# Patient Record
Sex: FEMALE | Race: ASIAN
[De-identification: names, ages, dates, MRNs, and addresses within clinical notes are randomized per-mention and may not be internally consistent; named-entity substitution may affect disease eponyms.]

---

## 2024-03-26 ENCOUNTER — APPOINTMENT (OUTPATIENT)
Dept: NEUROLOGY | Facility: CLINIC | Age: 79
End: 2024-03-26
Payer: MEDICARE

## 2024-03-26 VITALS
SYSTOLIC BLOOD PRESSURE: 137 MMHG | HEART RATE: 80 BPM | WEIGHT: 117 LBS | BODY MASS INDEX: 21.53 KG/M2 | HEIGHT: 62 IN | DIASTOLIC BLOOD PRESSURE: 65 MMHG | OXYGEN SATURATION: 98 %

## 2024-03-26 PROCEDURE — 99204 OFFICE O/P NEW MOD 45 MIN: CPT

## 2024-03-26 NOTE — PHYSICAL EXAM
[FreeTextEntry1] :   General: Cooperative, NAD HEENT: NC/AT, no carotid bruits Lungs: CTAB Chest: RRR, no murmurs Extremities: nontender, no erythema Neurological Examination: NIHSS: 0 MS: AOx3. Appropriately interactive, normal affect. Speech fluent w/o paraphasic errors, NO dysarthria CN: PERLL, EOMI, V1-3 sensation intact, face symmetric, hearing intact, palate elevates symmetrically, tongue midline, SCM equal bilaterally Motor: normal bulk and tone, no tremor, rigidity or bradykinesia.  5/5 all over Sens: Intact to light touch. Reflexes: 2/4 all over, downgoing toes b/l Coord:  No dysmetria, KIMBERLY intact Gait: Normal

## 2024-03-26 NOTE — HISTORY OF PRESENT ILLNESS
[FreeTextEntry1] :  [facility] NEUROLOGY AT [place]  CC: Possible slurred speech HPI:  78 y/o F hx of CAD, hx of PAF on Eliquis, hx of DVT, PPM who presents for evaluation of possible slurred speech.  She states a few weeks she was cleaning the counter and got up and hit the counter.  No LOC.  Had some pain in the left side of her head.  PCP ordered a CT head which was negative per daughter (a neurologist). No issues afterwards.  Also just went to [place] and came back yesterday.  States while there, she was shopping a few days ago in [place] and had chest pain, was seen by cardiologist, given IV lasix 80mg, TTE, ECG--all normal per patient.   But has noted since she came back she is having intermittent "dry mouth" and difficulty with pronunciation.  She is on lasix 20mg daily. Has noticed the dry mouth feeling even before she went to [place].  She is worried that she is having a stroke given her hx of PAF.     Previously followed by Dr. Rock Davis for primary stroke prevention.  Does give a hx of transient R facial droop in 2014--she told her daughter a neurologist--who sent her for CT which was negative.  Was told it was not a stroke.    Also has complaints of cramping in the legs last night (also just came back from [place] yesterday).      Denies any diplopia, dysarthria, dysphagia, numbness, tingling, weakness, difficulty walking, vision changes or other focal deficits. She maintains compliance with Eliquis 5mg BID.  She is scheduled to see her cardiologist soon.  Saw PCP today and is scheduled to obtain blood work.

## 2024-09-24 ENCOUNTER — APPOINTMENT (OUTPATIENT)
Dept: NEUROLOGY | Facility: CLINIC | Age: 79
End: 2024-09-24

## 2024-09-24 NOTE — ASSESSMENT
[FreeTextEntry1] :  78 y/o F hx of CAD, hx of PAF on Eliquis, hx of DVT, PPM who presents for evaluation of possible slurred speech and muscle cramping.   Patient has complaints of intermittent feeling of "dry mouth" and difficulty with pronunciation, but without any other focal deficits.  She is on daily lasix and recently received an IV dose that is 4x her normal dose.  I suspect this may be causing the dry mouth and the muscle cramping as well.   At this time, low suspicion of a neurovascular event.  I have however discussed with her and her daughter (Dr. Annette Sanchez--neurologist), that we should evaluate her carotids.  PLAN: Primary stroke prevention - Cont. Eliquis for PAF - Lipid Panel, HbA1c - Carotid duplex  Dry mouth/cramping--likely secondary to Lasix - CMP ordered by pcp - f/u with Cardiologist  f/u in 6 months

## 2024-09-24 NOTE — PHYSICAL EXAM
Discharge Instructions for Femoral Heart Catheterization  Take it easy for 3-4 days, limit vigorous activity (contact sports) for 2 weeks  No driving for 2 days  No lifting over 5 lbs for 1 week, this is a half gallon of milk  May shower after 24 hours  May remove dressing and apply band-aid after 24 hours.   Apply a clean band-aid daily for 5 days over the incision.  No creams, ointments, or powders near site for 2 weeks.  No hot tub, swimming or tub bath for 1 week  Gently clean your site daily using soap and water while standing in the shower. Dry thoroughly.  10. Monitor site for infection- redness, increased pain, hardness or drainage at site, elevated temperature, poor healing of incision, fever, chills.   11. If bleeding from incision, apply pressure and call 911 immediately.         Discharge Instructions for Radial Heart Catherization  1.  Take it easy for 3-4 days.  2.  No driving for 2 days.  3.  No lifting of 5 lbs or more for 5 days with the affected arm.  4.  May shower after 24 hours.  5.  Remove arm board after 24 hours.  6.  Apply a band aid to the insertion site daily for 5 days.  Wash site daily with soap and water.  7.  No creams, ointments, or powders near the insertion site.   8.  No tub baths, swimming, hot tubs, or hand washing dishes for 1 week.  9.  Watch for signs of infection (redness, warmth, swelling, or pus drainage) or coolness of extremity and call physician if this occurs  10.  If bleeding occurs from insertion site, apply pressure and call 911.   11. Watch for numbness/tingling- if this occurs go to the Emergency Room immediately.  12. Apply Ice for 15 minutes with an hour break in between and alternate with heat compress for 15 minutes to reduce swelling for 3-5 days.  
[FreeTextEntry1] :   General: Cooperative, NAD HEENT: NC/AT, no carotid bruits Lungs: CTAB Chest: RRR, no murmurs Extremities: nontender, no erythema Neurological Examination: NIHSS: 0 MS: AOx3. Appropriately interactive, normal affect. Speech fluent w/o paraphasic errors, NO dysarthria CN: PERLL, EOMI, V1-3 sensation intact, face symmetric, hearing intact, palate elevates symmetrically, tongue midline, SCM equal bilaterally Motor: normal bulk and tone, no tremor, rigidity or bradykinesia.  5/5 all over Sens: Intact to light touch. Reflexes: 2/4 all over, downgoing toes b/l Coord:  No dysmetria, KIMBERLY intact Gait: Normal
[FreeTextEntry1] :   General: Cooperative, NAD HEENT: NC/AT, no carotid bruits Lungs: CTAB Chest: RRR, no murmurs Extremities: nontender, no erythema Neurological Examination: NIHSS: 0 MS: AOx3. Appropriately interactive, normal affect. Speech fluent w/o paraphasic errors, NO dysarthria CN: PERLL, EOMI, V1-3 sensation intact, face symmetric, hearing intact, palate elevates symmetrically, tongue midline, SCM equal bilaterally Motor: normal bulk and tone, no tremor, rigidity or bradykinesia.  5/5 all over Sens: Intact to light touch. Reflexes: 2/4 all over, downgoing toes b/l Coord:  No dysmetria, KIMBERLY intact Gait: Normal

## 2024-09-27 ENCOUNTER — APPOINTMENT (OUTPATIENT)
Dept: NEUROLOGY | Facility: CLINIC | Age: 79
End: 2024-09-27
Payer: MEDICARE

## 2024-09-27 VITALS
DIASTOLIC BLOOD PRESSURE: 72 MMHG | OXYGEN SATURATION: 98 % | HEART RATE: 64 BPM | HEIGHT: 62 IN | BODY MASS INDEX: 22.08 KG/M2 | WEIGHT: 120 LBS | SYSTOLIC BLOOD PRESSURE: 163 MMHG

## 2024-09-27 DIAGNOSIS — R20.0 ANESTHESIA OF SKIN: ICD-10-CM

## 2024-09-27 PROCEDURE — 99204 OFFICE O/P NEW MOD 45 MIN: CPT

## 2024-09-27 PROCEDURE — 99214 OFFICE O/P EST MOD 30 MIN: CPT

## 2024-09-27 NOTE — ASSESSMENT
[FreeTextEntry1] :  78 y/o F hx of CAD, hx of PAF on Eliquis, hx of DVT, PPM who presents for evaluation of bilateral hand paresthesias and pain, and occasional gait imbalance.  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  Swaying, likely due to hx of vestibular dysfunction.    Given pacemaker, likely not compatible with MRI, therefore will evaluate with CTs. We discussed obtaining an EMG but she would hold off.     PLAN: Primary stroke prevention - Cont. Eliquis for PAF - Lipid Panel, HbA1c - Carotid duplex - BP elevated today--advised to f/u with Cardiologist  Bilateral hand numbness, likely CTS - Restart wearing wrist splints - Patient would like to hold off on EMG - CT cervical spine  Swaying, likely vestibular dysfunction - CT head, CUS - CT cervical spine  f/u in 2 months

## 2024-09-27 NOTE — PHYSICAL EXAM
[FreeTextEntry1] :   General: Cooperative, NAD HEENT: NC/AT, no carotid bruits Lungs: CTAB Chest: RRR, no murmurs Extremities: nontender, no erythema Neurological Examination: NIHSS: 0 MS: AOx3. Appropriately interactive, normal affect. Speech fluent w/o paraphasic errors, NO dysarthria CN: PERLL, EOMI, V1-3 sensation intact, face symmetric, hearing intact, palate elevates symmetrically, tongue midline, SCM equal bilaterally Motor:  5/5 all over except hand strength 4/5, atrophic thenar eminence b/l, neg tinel's sign Sens: Intact to light touch. Reflexes: 2/4 all over, downgoing toes b/l Coord:  No dysmetria, KIMBERLY intact Gait: Normal, neg Romberg's

## 2024-09-27 NOTE — HISTORY OF PRESENT ILLNESS
[FreeTextEntry1] :  [facility] NEUROLOGY AT [place]  CC: Possible slurred speech HPI:  78 y/o F hx of CAD, hx of PAF on Eliquis, hx of DVT, PPM who presents for follow-up of possible slurred speech.    3/26/24: She states a few weeks she was cleaning the counter and got up and hit the counter.  No LOC.  Had some pain in the left side of her head.  PCP ordered a CT head which was negative per daughter (a neurologist). No issues afterwards.  Also just went to [place] and came back yesterday.  States while there, she was shopping a few days ago in [place] and had chest pain, was seen by cardiologist, given IV lasix 80mg, TTE, ECG--all normal per patient.   But has noted since she came back she is having intermittent "dry mouth" and difficulty with pronunciation.  She is on lasix 20mg daily. Has noticed the dry mouth feeling even before she went to [place].  She is worried that she is having a stroke given her hx of PAF.     Previously followed by Dr. Rock Davis for primary stroke prevention.  Does give a hx of transient R facial droop in 2014--she told her daughter a neurologist--who sent her for CT which was negative.  Was told it was not a stroke.   Also has complaints of cramping in the legs last night (also just came back from [place] yesterday).     Denies any diplopia, dysarthria, dysphagia, numbness, tingling, weakness, difficulty walking, vision changes or other focal deficits. She maintains compliance with Eliquis 5mg BID.  She is scheduled to see her cardiologist soon.  Saw PCP today and is scheduled to obtain blood work.   Today 9/27/24: States she has numbness in both hands, mostly noticed upon awakening, or repetitive actions such as cutting or cleaning.  Also has intermittent swaying, mostly noted to left.  Feels that this is going on for past few years.  Does have a hx of vertigo and takes meclizine prn, but does not have vertigo associated it.  Thinks she has had an EMG in the past by Dr. Davis.  Unclear results.  Has a wrist splint.

## 2024-10-10 DIAGNOSIS — R26.81 UNSTEADINESS ON FEET: ICD-10-CM

## 2024-11-22 ENCOUNTER — APPOINTMENT (OUTPATIENT)
Dept: NEUROLOGY | Facility: CLINIC | Age: 79
End: 2024-11-22

## 2024-12-16 ENCOUNTER — APPOINTMENT (OUTPATIENT)
Dept: CT IMAGING | Facility: CLINIC | Age: 79
End: 2024-12-16

## 2025-06-25 ENCOUNTER — APPOINTMENT (OUTPATIENT)
Dept: CARDIOTHORACIC SURGERY | Facility: CLINIC | Age: 80
End: 2025-06-25
Payer: MEDICARE

## 2025-06-25 VITALS
HEART RATE: 72 BPM | OXYGEN SATURATION: 95 % | DIASTOLIC BLOOD PRESSURE: 75 MMHG | BODY MASS INDEX: 23 KG/M2 | SYSTOLIC BLOOD PRESSURE: 127 MMHG | RESPIRATION RATE: 14 BRPM | WEIGHT: 125 LBS | HEIGHT: 62 IN

## 2025-06-25 PROCEDURE — 99205 OFFICE O/P NEW HI 60 MIN: CPT

## 2025-06-25 RX ORDER — CARVEDILOL 12.5 MG/1
12.5 TABLET, FILM COATED ORAL
Refills: 0 | Status: ACTIVE | COMMUNITY
Start: 2025-06-25

## 2025-06-25 RX ORDER — ASPIRIN 81 MG/1
81 TABLET, DELAYED RELEASE ORAL DAILY
Qty: 30 | Refills: 0 | Status: ACTIVE | COMMUNITY
Start: 2025-06-25

## 2025-06-25 RX ORDER — FUROSEMIDE 20 MG/1
20 TABLET ORAL
Qty: 3 | Refills: 0 | Status: ACTIVE | COMMUNITY
Start: 2025-06-25

## 2025-06-30 ENCOUNTER — NON-APPOINTMENT (OUTPATIENT)
Age: 80
End: 2025-06-30

## 2025-06-30 RX ORDER — PREDNISONE 20 MG/1
1 TABLET ORAL
Qty: 3 | Refills: 0
Start: 2025-06-30 | End: 2025-06-30

## 2025-06-30 RX ORDER — CAMPHOR 0.45 %
25 GEL (GRAM) TOPICAL
Qty: 2 | Refills: 1 | Status: ACTIVE | COMMUNITY
Start: 2025-06-30 | End: 1900-01-01

## 2025-06-30 RX ORDER — PREDNISONE 50 MG/1
50 TABLET ORAL
Qty: 3 | Refills: 0 | Status: ACTIVE | COMMUNITY
Start: 2025-06-30 | End: 1900-01-01

## 2025-07-01 ENCOUNTER — TRANSCRIPTION ENCOUNTER (OUTPATIENT)
Age: 80
End: 2025-07-01

## 2025-07-01 ENCOUNTER — OUTPATIENT (OUTPATIENT)
Dept: OUTPATIENT SERVICES | Facility: HOSPITAL | Age: 80
LOS: 1 days | End: 2025-07-01
Payer: MEDICARE

## 2025-07-01 VITALS
RESPIRATION RATE: 16 BRPM | OXYGEN SATURATION: 98 % | SYSTOLIC BLOOD PRESSURE: 143 MMHG | DIASTOLIC BLOOD PRESSURE: 68 MMHG | HEART RATE: 62 BPM

## 2025-07-01 VITALS
DIASTOLIC BLOOD PRESSURE: 66 MMHG | HEART RATE: 60 BPM | HEIGHT: 62 IN | SYSTOLIC BLOOD PRESSURE: 147 MMHG | WEIGHT: 126.1 LBS | RESPIRATION RATE: 18 BRPM | OXYGEN SATURATION: 98 %

## 2025-07-01 DIAGNOSIS — I35.0 NONRHEUMATIC AORTIC (VALVE) STENOSIS: ICD-10-CM

## 2025-07-01 LAB
ANION GAP SERPL CALC-SCNC: 14 MMOL/L — SIGNIFICANT CHANGE UP (ref 5–17)
BUN SERPL-MCNC: 21 MG/DL — SIGNIFICANT CHANGE UP (ref 7–23)
CALCIUM SERPL-MCNC: 9.3 MG/DL — SIGNIFICANT CHANGE UP (ref 8.4–10.5)
CHLORIDE SERPL-SCNC: 102 MMOL/L — SIGNIFICANT CHANGE UP (ref 96–108)
CO2 SERPL-SCNC: 24 MMOL/L — SIGNIFICANT CHANGE UP (ref 22–31)
CREAT SERPL-MCNC: 1.1 MG/DL — SIGNIFICANT CHANGE UP (ref 0.5–1.3)
EGFR: 50 ML/MIN/1.73M2 — LOW
EGFR: 50 ML/MIN/1.73M2 — LOW
GLUCOSE SERPL-MCNC: 152 MG/DL — HIGH (ref 70–99)
HCT VFR BLD CALC: 40.5 % — SIGNIFICANT CHANGE UP (ref 34.5–45)
HGB BLD-MCNC: 13 G/DL — SIGNIFICANT CHANGE UP (ref 11.5–15.5)
MCHC RBC-ENTMCNC: 30.4 PG — SIGNIFICANT CHANGE UP (ref 27–34)
MCHC RBC-ENTMCNC: 32.1 G/DL — SIGNIFICANT CHANGE UP (ref 32–36)
MCV RBC AUTO: 94.8 FL — SIGNIFICANT CHANGE UP (ref 80–100)
NRBC # BLD AUTO: 0 K/UL — SIGNIFICANT CHANGE UP (ref 0–0)
NRBC # FLD: 0 K/UL — SIGNIFICANT CHANGE UP (ref 0–0)
NRBC BLD AUTO-RTO: 0 /100 WBCS — SIGNIFICANT CHANGE UP (ref 0–0)
PLATELET # BLD AUTO: 119 K/UL — LOW (ref 150–400)
PMV BLD: 10.6 FL — SIGNIFICANT CHANGE UP (ref 7–13)
POTASSIUM SERPL-MCNC: 4.9 MMOL/L — SIGNIFICANT CHANGE UP (ref 3.5–5.3)
POTASSIUM SERPL-SCNC: 4.9 MMOL/L — SIGNIFICANT CHANGE UP (ref 3.5–5.3)
RBC # BLD: 4.27 M/UL — SIGNIFICANT CHANGE UP (ref 3.8–5.2)
RBC # FLD: 12.4 % — SIGNIFICANT CHANGE UP (ref 10.3–14.5)
SODIUM SERPL-SCNC: 140 MMOL/L — SIGNIFICANT CHANGE UP (ref 135–145)
WBC # BLD: 4.9 K/UL — SIGNIFICANT CHANGE UP (ref 3.8–10.5)
WBC # FLD AUTO: 4.9 K/UL — SIGNIFICANT CHANGE UP (ref 3.8–10.5)

## 2025-07-01 PROCEDURE — 80048 BASIC METABOLIC PNL TOTAL CA: CPT

## 2025-07-01 PROCEDURE — 93454 CORONARY ARTERY ANGIO S&I: CPT

## 2025-07-01 PROCEDURE — C1769: CPT

## 2025-07-01 PROCEDURE — 93005 ELECTROCARDIOGRAM TRACING: CPT

## 2025-07-01 PROCEDURE — C1887: CPT

## 2025-07-01 PROCEDURE — 85027 COMPLETE CBC AUTOMATED: CPT

## 2025-07-01 PROCEDURE — 93454 CORONARY ARTERY ANGIO S&I: CPT | Mod: 26

## 2025-07-01 PROCEDURE — 93010 ELECTROCARDIOGRAM REPORT: CPT

## 2025-07-01 PROCEDURE — C1894: CPT

## 2025-07-01 PROCEDURE — 99152 MOD SED SAME PHYS/QHP 5/>YRS: CPT

## 2025-07-01 RX ORDER — ASPIRIN 325 MG
0 TABLET ORAL
Refills: 0 | DISCHARGE

## 2025-07-01 RX ORDER — FUROSEMIDE 10 MG/ML
1 INJECTION INTRAMUSCULAR; INTRAVENOUS
Refills: 0 | DISCHARGE

## 2025-07-01 RX ORDER — APIXABAN 2.5 MG/1
1 TABLET, FILM COATED ORAL
Qty: 0 | Refills: 0 | DISCHARGE

## 2025-07-01 RX ORDER — ROSUVASTATIN CALCIUM 5 MG/1
1 TABLET, FILM COATED ORAL
Refills: 0 | DISCHARGE

## 2025-07-01 RX ORDER — DIPHENHYDRAMINE HCL 12.5MG/5ML
50 ELIXIR ORAL ONCE
Refills: 0 | Status: COMPLETED | OUTPATIENT
Start: 2025-07-01 | End: 2025-07-01

## 2025-07-01 RX ORDER — CARVEDILOL 3.12 MG/1
1 TABLET, FILM COATED ORAL
Refills: 0 | DISCHARGE

## 2025-07-01 RX ORDER — ASPIRIN 325 MG
1 TABLET ORAL
Refills: 0 | DISCHARGE

## 2025-07-01 RX ADMIN — Medication 50 MILLIGRAM(S): at 14:57

## 2025-07-01 NOTE — ASU PATIENT PROFILE, ADULT - FALL HARM RISK - UNIVERSAL INTERVENTIONS
[facility]   Hospitalist Progress Note  Date: 2025  Patient Name: Desiree Garcia  : 1962  MRN: [id number]  Date of admission: 2025  Room/Bed: [facility]      Subjective   Subjective     Chief Complaint: Abdominal pain    Summary:Desiree Garcia is a 63 y.o. female with coronary disease, COPD, diabetes, vascular disease and other issues.  She presented with abdominal pain that began earlier on the day of admission.  EKG showed ST elevation in lead to 3 and aVF.  She underwent an emergent cardiac cath by Dr. Solano.  Noted to have distal RCA thrombosis status post mechanical aspiration thrombectomy, PCI with drug-eluting stent x 2.  She has been admitted to the medicine service.  CTA showed multiple abnormalities.    Interval Followup: No new issues still has some discomfort in her abdomen that seems unchanged.  Required amiodarone drip earlier      Objective   Objective     Vitals:   Temp:  [98.2 °F (36.8 °C)-99 °F (37.2 °C)] 98.8 °F (37.1 °C)  Heart Rate:  [] 92  Resp:  [16-26] 17  BP: (107-170)/() 133/85  Flow (L/min) (Oxygen Therapy):  [4] 4    Physical Exam   General: Lying in bed, looks like she does not feel well but is nontoxic  HENT: NCAT, MMM  Eyes: pupils equal, no scleral icterus  Cardiovascular: rr  Pulmonary: CTA bilaterally  Gastrointestinal: S/ND/NT, +BS  Musculoskeletal: No gross deformities  Skin: No jaundice, no rash on exposed skin appreciated      Result Review    Result Review:  I have personally reviewed these results:  [x]  Laboratory      Lab 25  0559 25  2201 259 254 25  1728   WBC 21.49*  --   --   --  21.93*   HEMOGLOBIN 14.8  --   --   --  15.4   HEMATOCRIT 45.0  --   --   --  46.3   PLATELETS 296  --   --   --  325   NEUTROS ABS  --   --   --   --  18.27*   IMMATURE GRANS (ABS)  --   --   --   --  0.10*   LYMPHS ABS  --   --   --   --  1.93   MONOS ABS  --   --   --   --  1.55*   EOS ABS  --   --   --   --  0.00    MCV 84.9  --   --   --  85.3   PROCALCITONIN  --   --   --  0.13  --    LACTATE  --   --  2.0  --   --    APTT 39.0* 55.8*  --   --   --          Lab 04/09/25  0602 04/09/25  0559 04/08/25  1728   SODIUM 131*  --  131*   POTASSIUM 3.6  --  3.7   CHLORIDE 92*  --  90*   CO2 26.7  --  25.8   ANION GAP 12.3  --  15.2*   BUN 10  --  9   CREATININE 0.80  --  0.74   EGFR 82.4  --  90.5   GLUCOSE 248*  --  294*   CALCIUM 9.7  --  10.1   MAGNESIUM 1.9  --  1.7   PHOSPHORUS 3.1  --   --    HEMOGLOBIN A1C  --  9.40*  --          Lab 04/08/25  1728   TOTAL PROTEIN 8.3   ALBUMIN 4.0   GLOBULIN 4.3   ALT (SGPT) 16   AST (SGOT) 45*   BILIRUBIN 1.1   ALK PHOS 159*   LIPASE 73*         Lab 04/08/25 2034 04/08/25  1728   PROBNP  --  15,432.0*   HSTROP T 971* 835*         Lab 04/09/25  0602   CHOLESTEROL 147   LDL CHOL 78   HDL CHOL 42   TRIGLYCERIDES 158*             Brief Urine Lab Results  (Last result in the past 365 days)        Color   Clarity   Blood   Leuk Est   Nitrite   Protein   CREAT   Urine HCG        03/18/25 1002 Yellow   Clear   Negative   Small (1+)   Positive   Negative                 [x]  Microbiology   Microbiology Results (last 10 days)       ** No results found for the last 240 hours. **          [x]  Radiology  CT Angiogram Abdomen Pelvis  Result Date: 4/8/2025  Impression: 1.Complete occlusion of the left superficial femoral artery. 2.Complete occlusion of the distal aspect of the left renal artery. 3.Complete occlusion of the distal aspect of the splenic artery. 4.Areas of hypoenhancement noted within the spleen, most significantly anteriorly, concerning for splenic infarcts. 5.Hypoenhancement of the inferior aspect of the left kidney, consistent with renal infarct. 6.Complete occlusion of the ONEIDA with reconstitution distally. 7.Multiple prominent fluid-filled loops of small bowel noted throughout the abdomen. No significant distention to suggest bowel obstruction. 8.Scattered pulmonary nodules, the  largest of which measures 0.6 cm. Multiple indeterminate pulmonary nodules. The largest one is solid and measures 6 mm. For multiple nodules, with the most suspicious nodule being solid and measuring 6-8 mm, recommend CT at 3-6 months. Then, for a low-risk patient, consider CT at 18-24 months. For a high-risk patient, if the nodule is stable at 3-6 months, recommend CT at 18-24 months. Follow-up intervals may vary according to size and risk. Electronically Signed: Julian Rosenthal DO  4/8/2025 7:11 PM EDT  Workstation ID: [id number]    CT Angiogram Chest  Result Date: 4/8/2025  Impression: 1.Complete occlusion of the left superficial femoral artery. 2.Complete occlusion of the distal aspect of the left renal artery. 3.Complete occlusion of the distal aspect of the splenic artery. 4.Areas of hypoenhancement noted within the spleen, most significantly anteriorly, concerning for splenic infarcts. 5.Hypoenhancement of the inferior aspect of the left kidney, consistent with renal infarct. 6.Complete occlusion of the ONEIDA with reconstitution distally. 7.Multiple prominent fluid-filled loops of small bowel noted throughout the abdomen. No significant distention to suggest bowel obstruction. 8.Scattered pulmonary nodules, the largest of which measures 0.6 cm. Multiple indeterminate pulmonary nodules. The largest one is solid and measures 6 mm. For multiple nodules, with the most suspicious nodule being solid and measuring 6-8 mm, recommend CT at 3-6 months. Then, for a low-risk patient, consider CT at 18-24 months. For a high-risk patient, if the nodule is stable at 3-6 months, recommend CT at 18-24 months. Follow-up intervals may vary according to size and risk. Electronically Signed: Julian Rosenthal DO  4/8/2025 7:11 PM EDT  Workstation ID: [id number]    []  EKG/Telemetry   []  Cardiology/Vascular   []  Pathology  []  Old records  []  Other:    Assessment & Plan   Assessment / Plan     Assessment:  Inferior STEMI status  post PCI with JOE x 2 on 4/8/2025  Complete occlusion of the distal branches of the left internal iliac artery   Complete occlusion of the left superficial femoral artery  Complete occlusion of the distal aspect of the left renal artery  Complete occlusion of the distal aspect of the splenic artery  Concern for splenic infarcts  Hypoenhancement of the inferior aspect of the left kidney consistent with renal infract  Multiple bilateral pulmonary nodules  COPD  History of CAD status post stent in 2021  Hypertension  Hyperlipidemia  Type 2 diabetes mellitus  PAD      Plan:  Admitted to ICU  Cardiology consulted, following and directing medical care of CAD.  Currently on heparin infusion and Brilinta  2D echo pending at this time  Amiodarone drip per cardiology  Intensivist consultation, will defer workup of pulmonary nodules to the neurology  Vascular surgery consulted by cardiology, advises continued anticoagulation     Discussed with RN.    VTE Prophylaxis:  Pharmacologic VTE prophylaxis orders are present.        CODE STATUS:   Code Status (Patient has no pulse and is not breathing): CPR (Attempt to Resuscitate)  Medical Interventions (Patient has pulse or is breathing): Full Support  Level Of Support Discussed With: Patient      Electronically signed by Jose Butts MD, 4/9/2025, 10:02 EDT.                       Bed in lowest position, wheels locked, appropriate side rails in place/Call bell, personal items and telephone in reach/Instruct patient to call for assistance before getting out of bed or chair/Non-slip footwear when patient is out of bed/[place] to call system/Physically safe environment - no spills, clutter or unnecessary equipment/Purposeful Proactive Rounding/Room/bathroom lighting operational, light cord in reach

## 2025-07-01 NOTE — H&P CARDIOLOGY - NSICDXPASTSURGICALHX_GEN_ALL_CORE_FT
PAST SURGICAL HISTORY:  H/O tubal ligation     History of dilatation and curettage     Right wrist fracture/ORIF 2010- hardware

## 2025-07-01 NOTE — ASU DISCHARGE PLAN (ADULT/PEDIATRIC) - NS MD DC FALL RISK RISK
For information on Fall & Injury Prevention, visit: [email or web address] OR  [email or web address] OR  https://www.cdc.gov/steadi/patient.html

## 2025-07-01 NOTE — H&P CARDIOLOGY - HISTORY OF PRESENT ILLNESS
81 y/o female with PMHx  of AD, PAF on Eliquis, PPM, DVT Vertigo, TIA 2014 admitted to [facility] with stroke like symptoms in 6/2025 - MRA negative (per patient) and bicuspid aortic valve stenosis s/p replacement of ascending aorta and AVR (t) 21 Ter[address] Shine on 8/20/2012 now reporteing NYHA class II heart failure symptoms including progressive exertional dyspnea, pedal edema, fatigue and occasional palpitations.   TTE on 6/8/25 revealed EF 57%, Bio prosthetic aortic valve, prosthetic aortic valve, LUIS ALFREDO 0.6 sq cm and Severe TR.  She was referred to CT surgery for eval and is now under evaluation by structural heart for TAVR.  Patient presents today for [facility] to define coronary artery anatomy.    Referring -  Dr. Spencer     ?

## 2025-07-01 NOTE — ASU DISCHARGE PLAN (ADULT/PEDIATRIC) - CARE PROVIDER_API CALL
Armaan Spencer  Thoracic and Cardiac Surgery  [address] [place] [id number]  Phone: [phone]  Fax: [phone]  Established Patient  Follow Up Time: Routine

## 2025-07-01 NOTE — ASU DISCHARGE PLAN (ADULT/PEDIATRIC) - FINANCIAL ASSISTANCE
[facility] provides services at a reduced cost to those who are determined to be eligible through [facility]’s financial assistance program. Information regarding [facility]’s financial assistance program can be found by going to [email or web address] or by calling [phone].

## 2025-07-01 NOTE — H&P CARDIOLOGY - NSICDXPASTMEDICALHX_GEN_ALL_CORE_FT
PAST MEDICAL HISTORY:  Acid reflux disease     Aortic valve disorder     Deep vein thrombosis (DVT) 1990 - txted with heparin and coumadin    Dyslipidemia     H/O thyroid nodule     Migraines     Osteopenia     Vertigo     Vitiligo

## 2025-07-08 ENCOUNTER — APPOINTMENT (OUTPATIENT)
Dept: CARDIOTHORACIC SURGERY | Facility: CLINIC | Age: 80
End: 2025-07-08

## 2025-07-08 VITALS
HEIGHT: 62 IN | DIASTOLIC BLOOD PRESSURE: 70 MMHG | OXYGEN SATURATION: 97 % | WEIGHT: 124 LBS | BODY MASS INDEX: 22.82 KG/M2 | HEART RATE: 61 BPM | RESPIRATION RATE: 18 BRPM | SYSTOLIC BLOOD PRESSURE: 125 MMHG

## 2025-07-08 PROBLEM — Z91.041 ALLERGY TO INTRAVENOUS CONTRAST: Status: ACTIVE | Noted: 2025-07-08

## 2025-07-08 PROCEDURE — 99205 OFFICE O/P NEW HI 60 MIN: CPT

## 2025-07-09 ENCOUNTER — NON-APPOINTMENT (OUTPATIENT)
Age: 80
End: 2025-07-09

## 2025-07-09 PROBLEM — I50.33 ACUTE ON CHRONIC DIASTOLIC CONGESTIVE HEART FAILURE: Status: ACTIVE | Noted: 2025-07-09

## 2025-07-09 LAB
ALBUMIN SERPL ELPH-MCNC: 4.3 G/DL
ALP BLD-CCNC: 74 U/L
ALT SERPL-CCNC: 22 U/L
ANION GAP SERPL CALC-SCNC: 15 MMOL/L
AST SERPL-CCNC: 29 U/L
BILIRUB SERPL-MCNC: 0.5 MG/DL
BUN SERPL-MCNC: 25 MG/DL
CALCIUM SERPL-MCNC: 9.6 MG/DL
CHLORIDE SERPL-SCNC: 101 MMOL/L
CO2 SERPL-SCNC: 27 MMOL/L
CREAT SERPL-MCNC: 1.11 MG/DL
EGFRCR SERPLBLD CKD-EPI 2021: 50 ML/MIN/1.73M2
GLUCOSE SERPL-MCNC: 97 MG/DL
HCT VFR BLD CALC: 41.4 %
HGB BLD-MCNC: 13.1 G/DL
MCHC RBC-ENTMCNC: 30.6 PG
MCHC RBC-ENTMCNC: 31.6 G/DL
MCV RBC AUTO: 96.7 FL
NT-PROBNP SERPL-MCNC: 899 PG/ML
PLATELET # BLD AUTO: 138 K/UL
POTASSIUM SERPL-SCNC: 4.6 MMOL/L
PROT SERPL-MCNC: 6.5 G/DL
RBC # BLD: 4.28 M/UL
RBC # FLD: 13 %
SODIUM SERPL-SCNC: 143 MMOL/L
WBC # FLD AUTO: 6.57 K/UL

## 2025-07-09 RX ORDER — DIPHENHYDRAMINE HCL 50 MG/1
50 CAPSULE ORAL
Qty: 1 | Refills: 1 | Status: DISCONTINUED | COMMUNITY
Start: 2025-07-08 | End: 2025-07-09

## 2025-07-09 RX ORDER — PREDNISONE 50 MG/1
50 TABLET ORAL
Qty: 3 | Refills: 1 | Status: DISCONTINUED | COMMUNITY
Start: 2025-07-08 | End: 2025-07-09

## 2025-07-09 RX ORDER — DAPAGLIFLOZIN 5 MG/1
5 TABLET, FILM COATED ORAL
Qty: 30 | Refills: 3 | Status: ACTIVE | COMMUNITY
Start: 2025-07-09 | End: 1900-01-01

## 2025-07-15 ENCOUNTER — APPOINTMENT (OUTPATIENT)
Dept: CT IMAGING | Facility: CLINIC | Age: 80
End: 2025-07-15

## 2025-08-08 PROBLEM — R93.1 ABNORMAL ECHOCARDIOGRAM: Status: ACTIVE | Noted: 2025-08-08

## 2025-08-08 PROBLEM — I38 VALVULAR HEART DISEASE: Status: ACTIVE | Noted: 2025-08-08

## 2025-08-08 PROBLEM — R06.00 DYSPNEA: Status: ACTIVE | Noted: 2025-08-08

## 2025-08-25 ENCOUNTER — APPOINTMENT (OUTPATIENT)
Dept: CARDIOLOGY | Facility: CLINIC | Age: 80
End: 2025-08-25

## 2025-09-02 ENCOUNTER — APPOINTMENT (OUTPATIENT)
Dept: CARDIOTHORACIC SURGERY | Facility: CLINIC | Age: 80
End: 2025-09-02

## 2025-09-02 ENCOUNTER — RESULT REVIEW (OUTPATIENT)
Age: 80
End: 2025-09-02

## 2025-09-02 VITALS
OXYGEN SATURATION: 98 % | HEIGHT: 62 IN | WEIGHT: 123 LBS | HEART RATE: 63 BPM | RESPIRATION RATE: 18 BRPM | DIASTOLIC BLOOD PRESSURE: 72 MMHG | SYSTOLIC BLOOD PRESSURE: 132 MMHG | BODY MASS INDEX: 22.63 KG/M2

## 2025-09-02 DIAGNOSIS — I07.1 RHEUMATIC TRICUSPID INSUFFICIENCY: ICD-10-CM

## 2025-09-02 RX ORDER — PREDNISONE 50 MG/1
50 TABLET ORAL
Qty: 3 | Refills: 1 | Status: ACTIVE | COMMUNITY
Start: 2025-09-02 | End: 1900-01-01

## 2025-09-02 RX ORDER — METOPROLOL SUCCINATE 50 MG/1
50 TABLET, EXTENDED RELEASE ORAL DAILY
Qty: 60 | Refills: 1 | Status: ACTIVE | COMMUNITY
Start: 2025-09-02 | End: 1900-01-01

## 2025-09-02 RX ORDER — OMEGA-3/DHA/EPA/FISH OIL 300-1000MG
400 CAPSULE ORAL DAILY
Refills: 0 | Status: ACTIVE | COMMUNITY

## 2025-09-02 RX ORDER — DIPHENHYDRAMINE HCL 50 MG/1
50 CAPSULE ORAL
Qty: 1 | Refills: 1 | Status: ACTIVE | COMMUNITY
Start: 2025-09-02 | End: 1900-01-01

## 2025-09-09 ENCOUNTER — APPOINTMENT (OUTPATIENT)
Dept: CARDIOLOGY | Facility: CLINIC | Age: 80
End: 2025-09-09